# Patient Record
Sex: OTHER/UNKNOWN | HISPANIC OR LATINO | URBAN - METROPOLITAN AREA
[De-identification: names, ages, dates, MRNs, and addresses within clinical notes are randomized per-mention and may not be internally consistent; named-entity substitution may affect disease eponyms.]

---

## 2017-05-01 ENCOUNTER — APPOINTMENT (OUTPATIENT)
Age: 42
Setting detail: DERMATOLOGY
End: 2017-05-02

## 2017-05-01 VITALS
SYSTOLIC BLOOD PRESSURE: 143 MMHG | HEART RATE: 76 BPM | WEIGHT: 180 LBS | HEIGHT: 71 IN | DIASTOLIC BLOOD PRESSURE: 88 MMHG

## 2017-05-01 DIAGNOSIS — L91.0 HYPERTROPHIC SCAR: ICD-10-CM

## 2017-05-01 PROBLEM — L70.0 ACNE VULGARIS: Status: ACTIVE | Noted: 2017-05-01

## 2017-05-01 PROCEDURE — 11901 INJECT SKIN LESIONS >7: CPT

## 2017-05-01 PROCEDURE — OTHER COUNSELING: OTHER

## 2017-05-01 PROCEDURE — OTHER MIPS QUALITY: OTHER

## 2017-05-01 PROCEDURE — OTHER INTRALESIONAL KENALOG: OTHER

## 2017-05-01 ASSESSMENT — LOCATION ZONE DERM
LOCATION ZONE: ARM
LOCATION ZONE: TRUNK

## 2017-05-01 ASSESSMENT — LOCATION DETAILED DESCRIPTION DERM
LOCATION DETAILED: RIGHT POSTERIOR SHOULDER
LOCATION DETAILED: RIGHT ANTERIOR SHOULDER
LOCATION DETAILED: RIGHT LATERAL INFERIOR CHEST
LOCATION DETAILED: LEFT LATERAL SUPERIOR CHEST
LOCATION DETAILED: LEFT LATERAL INFERIOR CHEST
LOCATION DETAILED: LEFT MEDIAL INFERIOR CHEST
LOCATION DETAILED: RIGHT LATERAL SUPERIOR CHEST

## 2017-05-01 ASSESSMENT — LOCATION SIMPLE DESCRIPTION DERM
LOCATION SIMPLE: CHEST
LOCATION SIMPLE: RIGHT SHOULDER

## 2017-05-01 NOTE — PROCEDURE: MIPS QUALITY
Detail Level: Detailed
Quality 226: Preventive Care And Screening: Tobacco Use: Screening And Cessation Intervention: Patient screened for tobacco and never smoked
Quality 130: Documentation Of Current Medications In The Medical Record: Current Medications Documented
Quality 317: Preventative Care And Screening: Screening For High Blood Pressure And Follow-Up Documented: Pre-hypertensive or hypertensive blood pressure reading documented, and the indicated follow-up is documented

## 2017-10-30 ENCOUNTER — APPOINTMENT (OUTPATIENT)
Age: 42
Setting detail: DERMATOLOGY
End: 2017-10-30

## 2017-10-30 VITALS
WEIGHT: 185 LBS | HEART RATE: 75 BPM | DIASTOLIC BLOOD PRESSURE: 80 MMHG | SYSTOLIC BLOOD PRESSURE: 126 MMHG | HEIGHT: 71 IN

## 2017-10-30 DIAGNOSIS — L91.0 HYPERTROPHIC SCAR: ICD-10-CM

## 2017-10-30 PROCEDURE — OTHER COUNSELING: OTHER

## 2017-10-30 PROCEDURE — OTHER INTRALESIONAL KENALOG: OTHER

## 2017-10-30 PROCEDURE — OTHER MIPS QUALITY: OTHER

## 2017-10-30 PROCEDURE — OTHER TREATMENT REGIMEN: OTHER

## 2017-10-30 PROCEDURE — 11901 INJECT SKIN LESIONS >7: CPT

## 2017-10-30 ASSESSMENT — LOCATION SIMPLE DESCRIPTION DERM
LOCATION SIMPLE: LEFT CLAVICULAR SKIN
LOCATION SIMPLE: RIGHT UPPER ARM
LOCATION SIMPLE: RIGHT SHOULDER
LOCATION SIMPLE: LEFT ANTERIOR NECK
LOCATION SIMPLE: RIGHT ANTERIOR NECK
LOCATION SIMPLE: CHEST

## 2017-10-30 ASSESSMENT — LOCATION ZONE DERM
LOCATION ZONE: NECK
LOCATION ZONE: ARM
LOCATION ZONE: TRUNK

## 2017-10-30 ASSESSMENT — LOCATION DETAILED DESCRIPTION DERM
LOCATION DETAILED: RIGHT MEDIAL SUPERIOR CHEST
LOCATION DETAILED: RIGHT CLAVICULAR NECK
LOCATION DETAILED: RIGHT LATERAL SUPERIOR CHEST
LOCATION DETAILED: RIGHT PROXIMAL POSTERIOR UPPER ARM
LOCATION DETAILED: RIGHT LATERAL INFERIOR CHEST
LOCATION DETAILED: LEFT LATERAL SUPERIOR CHEST
LOCATION DETAILED: LEFT CLAVICULAR NECK
LOCATION DETAILED: RIGHT POSTERIOR SHOULDER
LOCATION DETAILED: LEFT CLAVICULAR SKIN
LOCATION DETAILED: LEFT MEDIAL INFERIOR CHEST
LOCATION DETAILED: STERNUM

## 2017-10-30 ASSESSMENT — SCAR ASSESSEMENT OVERALL: SCAR ASSESSMENT: 2 (RAISED UNIFORM SCAR, NO ERYTHEMA)

## 2017-12-04 ENCOUNTER — APPOINTMENT (OUTPATIENT)
Age: 42
Setting detail: DERMATOLOGY
End: 2017-12-05

## 2017-12-04 VITALS
HEART RATE: 80 BPM | SYSTOLIC BLOOD PRESSURE: 107 MMHG | HEIGHT: 71 IN | DIASTOLIC BLOOD PRESSURE: 75 MMHG | WEIGHT: 185 LBS

## 2017-12-04 DIAGNOSIS — L91.0 HYPERTROPHIC SCAR: ICD-10-CM

## 2017-12-04 PROCEDURE — 11901 INJECT SKIN LESIONS >7: CPT

## 2017-12-04 PROCEDURE — OTHER TREATMENT REGIMEN: OTHER

## 2017-12-04 PROCEDURE — OTHER MIPS QUALITY: OTHER

## 2017-12-04 PROCEDURE — OTHER INTRALESIONAL KENALOG: OTHER

## 2017-12-04 PROCEDURE — OTHER COUNSELING: OTHER

## 2017-12-04 ASSESSMENT — SCAR ASSESSEMENT OVERALL: SCAR ASSESSMENT: 2 (RAISED UNIFORM SCAR, NO ERYTHEMA)

## 2017-12-04 ASSESSMENT — LOCATION SIMPLE DESCRIPTION DERM
LOCATION SIMPLE: LEFT ANTERIOR NECK
LOCATION SIMPLE: RIGHT SHOULDER
LOCATION SIMPLE: CHEST

## 2017-12-04 ASSESSMENT — LOCATION DETAILED DESCRIPTION DERM
LOCATION DETAILED: RIGHT MEDIAL SUPERIOR CHEST
LOCATION DETAILED: LEFT LATERAL SUPERIOR CHEST
LOCATION DETAILED: LEFT MEDIAL INFERIOR CHEST
LOCATION DETAILED: LEFT CLAVICULAR NECK
LOCATION DETAILED: STERNUM
LOCATION DETAILED: RIGHT POSTERIOR SHOULDER

## 2017-12-04 ASSESSMENT — LOCATION ZONE DERM
LOCATION ZONE: ARM
LOCATION ZONE: NECK
LOCATION ZONE: TRUNK

## 2018-01-08 ENCOUNTER — APPOINTMENT (OUTPATIENT)
Age: 43
Setting detail: DERMATOLOGY
End: 2018-01-10

## 2018-01-08 VITALS
HEIGHT: 71 IN | HEART RATE: 78 BPM | DIASTOLIC BLOOD PRESSURE: 87 MMHG | WEIGHT: 185 LBS | SYSTOLIC BLOOD PRESSURE: 128 MMHG

## 2018-01-08 DIAGNOSIS — L91.0 HYPERTROPHIC SCAR: ICD-10-CM

## 2018-01-08 PROCEDURE — OTHER TREATMENT REGIMEN: OTHER

## 2018-01-08 PROCEDURE — 11901 INJECT SKIN LESIONS >7: CPT

## 2018-01-08 PROCEDURE — OTHER INTRALESIONAL KENALOG: OTHER

## 2018-01-08 PROCEDURE — OTHER MIPS QUALITY: OTHER

## 2018-01-08 PROCEDURE — OTHER COUNSELING: OTHER

## 2018-01-08 ASSESSMENT — LOCATION DETAILED DESCRIPTION DERM
LOCATION DETAILED: RIGHT POSTERIOR SHOULDER
LOCATION DETAILED: LEFT MEDIAL INFERIOR CHEST
LOCATION DETAILED: STERNUM
LOCATION DETAILED: RIGHT MEDIAL SUPERIOR CHEST
LOCATION DETAILED: LEFT ANTERIOR SHOULDER

## 2018-01-08 ASSESSMENT — LOCATION ZONE DERM
LOCATION ZONE: ARM
LOCATION ZONE: TRUNK

## 2018-01-08 ASSESSMENT — LOCATION SIMPLE DESCRIPTION DERM
LOCATION SIMPLE: RIGHT SHOULDER
LOCATION SIMPLE: CHEST
LOCATION SIMPLE: LEFT SHOULDER

## 2018-08-06 ENCOUNTER — APPOINTMENT (OUTPATIENT)
Age: 43
Setting detail: DERMATOLOGY
End: 2018-08-08

## 2018-08-06 ENCOUNTER — RX ONLY (RX ONLY)
Age: 43
End: 2018-08-06

## 2018-08-06 VITALS
HEART RATE: 70 BPM | SYSTOLIC BLOOD PRESSURE: 133 MMHG | WEIGHT: 175 LBS | DIASTOLIC BLOOD PRESSURE: 89 MMHG | HEIGHT: 71 IN

## 2018-08-06 DIAGNOSIS — B35.4 TINEA CORPORIS: ICD-10-CM

## 2018-08-06 PROCEDURE — OTHER COUNSELING: OTHER

## 2018-08-06 PROCEDURE — OTHER PRESCRIPTION: OTHER

## 2018-08-06 PROCEDURE — OTHER MIPS QUALITY: OTHER

## 2018-08-06 PROCEDURE — 99214 OFFICE O/P EST MOD 30 MIN: CPT

## 2018-08-06 RX ORDER — CICLOPIROX OLAMINE 7.7 MG/G
1 CREAM TOPICAL BID
Qty: 1 | Refills: 2 | Status: ERX | COMMUNITY
Start: 2018-08-06

## 2018-08-06 RX ORDER — CICLOPIROX OLAMINE 7.7 MG/G
1 CREAM TOPICAL BID
Qty: 1 | Refills: 2 | Status: ERX

## 2018-08-06 ASSESSMENT — LOCATION DETAILED DESCRIPTION DERM: LOCATION DETAILED: RIGHT ANTERIOR PROXIMAL THIGH

## 2018-08-06 ASSESSMENT — LOCATION ZONE DERM: LOCATION ZONE: LEG

## 2018-08-06 ASSESSMENT — LOCATION SIMPLE DESCRIPTION DERM: LOCATION SIMPLE: RIGHT THIGH

## 2018-09-10 ENCOUNTER — APPOINTMENT (OUTPATIENT)
Age: 43
Setting detail: DERMATOLOGY
End: 2018-09-11

## 2018-09-10 DIAGNOSIS — B35.4 TINEA CORPORIS: ICD-10-CM

## 2018-09-10 PROBLEM — L70.0 ACNE VULGARIS: Status: ACTIVE | Noted: 2018-09-10

## 2018-09-10 PROCEDURE — OTHER TREATMENT REGIMEN: OTHER

## 2018-09-10 PROCEDURE — OTHER COUNSELING: OTHER

## 2018-09-10 PROCEDURE — 99213 OFFICE O/P EST LOW 20 MIN: CPT

## 2018-09-10 PROCEDURE — OTHER REASSURANCE: OTHER

## 2018-09-10 PROCEDURE — OTHER MIPS QUALITY: OTHER

## 2018-09-10 ASSESSMENT — LOCATION ZONE DERM: LOCATION ZONE: LEG

## 2018-09-10 ASSESSMENT — LOCATION SIMPLE DESCRIPTION DERM: LOCATION SIMPLE: RIGHT THIGH

## 2018-09-10 ASSESSMENT — LOCATION DETAILED DESCRIPTION DERM
LOCATION DETAILED: RIGHT ANTERIOR DISTAL THIGH
LOCATION DETAILED: RIGHT ANTERIOR PROXIMAL THIGH